# Patient Record
Sex: FEMALE | Race: WHITE | NOT HISPANIC OR LATINO | URBAN - NONMETROPOLITAN AREA
[De-identification: names, ages, dates, MRNs, and addresses within clinical notes are randomized per-mention and may not be internally consistent; named-entity substitution may affect disease eponyms.]

---

## 2024-04-03 ENCOUNTER — APPOINTMENT (OUTPATIENT)
Dept: URBAN - NONMETROPOLITAN AREA CLINIC 45 | Age: 19
Setting detail: DERMATOLOGY
End: 2024-04-03

## 2024-04-03 DIAGNOSIS — L70.0 ACNE VULGARIS: ICD-10-CM

## 2024-04-03 PROCEDURE — OTHER PRESCRIPTION MEDICATION MANAGEMENT: OTHER

## 2024-04-03 PROCEDURE — OTHER MIPS QUALITY: OTHER

## 2024-04-03 PROCEDURE — 11900 INJECT SKIN LESIONS </W 7: CPT

## 2024-04-03 PROCEDURE — OTHER PRESCRIPTION: OTHER

## 2024-04-03 PROCEDURE — 99203 OFFICE O/P NEW LOW 30 MIN: CPT | Mod: 25

## 2024-04-03 PROCEDURE — OTHER INTRALESIONAL KENALOG: OTHER

## 2024-04-03 RX ORDER — SPIRONOLACTONE 100 MG/1
TABLET, FILM COATED ORAL
Qty: 30 | Refills: 3 | Status: ERX | COMMUNITY
Start: 2024-04-03

## 2024-04-03 ASSESSMENT — LOCATION DETAILED DESCRIPTION DERM
LOCATION DETAILED: RIGHT MEDIAL MALAR CHEEK
LOCATION DETAILED: RIGHT INFERIOR MEDIAL MALAR CHEEK

## 2024-04-03 ASSESSMENT — LOCATION ZONE DERM: LOCATION ZONE: FACE

## 2024-04-03 ASSESSMENT — LOCATION SIMPLE DESCRIPTION DERM: LOCATION SIMPLE: RIGHT CHEEK

## 2024-04-03 NOTE — PROCEDURE: PRESCRIPTION MEDICATION MANAGEMENT
Render In Strict Bullet Format?: No
Plan: Spironolactone 100 mg once a day \\nCont Doryx 60 mg for one more month ( pt already has script )\\nCont Aklief ( but use all over face ) at night ( pt already has script ) \\nCont Clindamycin lotion 1-2 times a day ( pt already has script )\\nD/C Picking
Detail Level: Zone

## 2024-04-03 NOTE — PROCEDURE: INTRALESIONAL KENALOG
How Many Mls Were Removed From The 40 Mg/Ml (10ml) Vial When Preparing The Injectable Solution?: 0
Kenalog Type Of Vial: Single Dose
Ndc# For Kenalog Only: 3174-1843-47
Validate Note Data When Using Inventory: Yes
Total Volume (Ccs): 0.1
Show Inventory Tab: Hide
Medical Necessity Clause: This procedure was medically necessary because the lesions that were treated were:
Bill For Wasted Drug (Kenalog)?: no
Administered By (Optional): Jluis Dudley Md
Lot # For Kenalog (Optional): [id number]
Consent: The risks of atrophy were reviewed with the patient.
Kenalog Preparation: Kenalog
Expiration Date For Kenalog (Optional): 12/2025
Detail Level: Detailed
Concentration Of Kenalog Solution Injected (Mg/Ml): 2.5

## 2024-04-16 ENCOUNTER — APPOINTMENT (OUTPATIENT)
Dept: URBAN - NONMETROPOLITAN AREA CLINIC 45 | Age: 19
Setting detail: DERMATOLOGY
End: 2024-04-22

## 2024-04-16 DIAGNOSIS — Z41.9 ENCOUNTER FOR PROCEDURE FOR PURPOSES OTHER THAN REMEDYING HEALTH STATE, UNSPECIFIED: ICD-10-CM

## 2024-04-16 PROCEDURE — OTHER FACIAL: OTHER

## 2024-04-16 ASSESSMENT — LOCATION SIMPLE DESCRIPTION DERM: LOCATION SIMPLE: LEFT FOREHEAD

## 2024-04-16 ASSESSMENT — LOCATION ZONE DERM: LOCATION ZONE: FACE

## 2024-04-16 ASSESSMENT — LOCATION DETAILED DESCRIPTION DERM: LOCATION DETAILED: LEFT MEDIAL FOREHEAD

## 2024-04-16 NOTE — PROCEDURE: FACIAL
Comments (Non-Sticky): Full facial $100
Treatment Type (Optional): European Facial
Cryo Stick Massage (Optional): yes
Detail Level: Zone
Exfoliation Type Override: Papaya
Comments (Sticky): Products used: SkinMedica Gentle Cleanser, ZO Calming Tone, TNS Calming + Hydrating Mask, BioTone Massage Gel, HA5, Renewal Cream, Elta MD Sheer SPF.
Facial Steaming: steamed
Extraction Method: comedone extractor
Exfoliation Type: enzyme

## 2024-07-17 ENCOUNTER — RX ONLY (RX ONLY)
Age: 19
End: 2024-07-17

## 2024-07-17 RX ORDER — SPIRONOLACTONE 100 MG/1
ONE TABLET, FILM COATED ORAL ONCE A DAY
Qty: 30 | Refills: 0 | Status: ERX

## 2024-10-03 ENCOUNTER — APPOINTMENT (OUTPATIENT)
Dept: URBAN - NONMETROPOLITAN AREA CLINIC 45 | Age: 19
Setting detail: DERMATOLOGY
End: 2024-10-03

## 2024-10-03 DIAGNOSIS — Z41.9 ENCOUNTER FOR PROCEDURE FOR PURPOSES OTHER THAN REMEDYING HEALTH STATE, UNSPECIFIED: ICD-10-CM

## 2024-10-03 PROCEDURE — OTHER FACIAL: OTHER

## 2024-10-03 ASSESSMENT — LOCATION ZONE DERM: LOCATION ZONE: FACE

## 2024-10-03 ASSESSMENT — LOCATION DETAILED DESCRIPTION DERM: LOCATION DETAILED: RIGHT MEDIAL FOREHEAD

## 2024-10-03 ASSESSMENT — LOCATION SIMPLE DESCRIPTION DERM: LOCATION SIMPLE: RIGHT FOREHEAD

## 2024-10-03 NOTE — PROCEDURE: FACIAL
Cryo Stick Massage (Optional): yes
Facial Steaming: steamed
Treatment Type (Optional): European Facial
Exfoliation Type Override: Radiance Refining Mask
Detail Level: Zone
Extraction Method: 11 blade and comedo extractor
Price (Use Numbers Only, No Special Characters Or $): 85.0
Exfoliation Type: enzyme
Comments (Sticky): Products used: ZO Gentle Cleanser, ZO Calming Toner, ZO Aloe Gel Mask, TNS Hydrating Mask, HA5, Renewal Cream, Elta MD Sheer SPF, LipSmart.